# Patient Record
Sex: FEMALE | Race: WHITE | NOT HISPANIC OR LATINO
[De-identification: names, ages, dates, MRNs, and addresses within clinical notes are randomized per-mention and may not be internally consistent; named-entity substitution may affect disease eponyms.]

---

## 2017-01-31 ENCOUNTER — RECORDS - HEALTHEAST (OUTPATIENT)
Dept: ADMINISTRATIVE | Facility: OTHER | Age: 42
End: 2017-01-31

## 2017-03-02 ENCOUNTER — RECORDS - HEALTHEAST (OUTPATIENT)
Dept: ADMINISTRATIVE | Facility: OTHER | Age: 42
End: 2017-03-02

## 2017-05-02 ENCOUNTER — RECORDS - HEALTHEAST (OUTPATIENT)
Dept: ADMINISTRATIVE | Facility: OTHER | Age: 42
End: 2017-05-02

## 2017-05-03 ENCOUNTER — RECORDS - HEALTHEAST (OUTPATIENT)
Dept: ADMINISTRATIVE | Facility: OTHER | Age: 42
End: 2017-05-03

## 2017-05-30 ENCOUNTER — RECORDS - HEALTHEAST (OUTPATIENT)
Dept: ADMINISTRATIVE | Facility: OTHER | Age: 42
End: 2017-05-30

## 2017-08-24 ENCOUNTER — RECORDS - HEALTHEAST (OUTPATIENT)
Dept: ADMINISTRATIVE | Facility: OTHER | Age: 42
End: 2017-08-24

## 2017-10-03 ENCOUNTER — RECORDS - HEALTHEAST (OUTPATIENT)
Dept: ADMINISTRATIVE | Facility: OTHER | Age: 42
End: 2017-10-03

## 2017-10-11 ENCOUNTER — OFFICE VISIT - HEALTHEAST (OUTPATIENT)
Dept: FAMILY MEDICINE | Facility: CLINIC | Age: 42
End: 2017-10-11

## 2017-10-11 DIAGNOSIS — N81.6 RECTOCELE: ICD-10-CM

## 2017-10-11 DIAGNOSIS — R05.9 COUGH: ICD-10-CM

## 2017-10-11 DIAGNOSIS — F39 MOOD DISORDER (H): ICD-10-CM

## 2017-10-11 DIAGNOSIS — F41.9 ANXIETY: ICD-10-CM

## 2017-10-11 DIAGNOSIS — K64.9 HEMORRHOID: ICD-10-CM

## 2017-10-11 DIAGNOSIS — E66.9 OBESITY: ICD-10-CM

## 2017-10-11 RX ORDER — LORATADINE 10 MG/1
10 TABLET ORAL
Status: SHIPPED | COMMUNITY
Start: 2017-10-11

## 2017-10-18 ENCOUNTER — THERAPY VISIT (OUTPATIENT)
Dept: PHYSICAL THERAPY | Facility: CLINIC | Age: 42
End: 2017-10-18
Payer: COMMERCIAL

## 2017-10-18 DIAGNOSIS — M25.552 PAIN IN JOINT INVOLVING PELVIC REGION AND THIGH, LEFT: Primary | ICD-10-CM

## 2017-10-18 DIAGNOSIS — K56.41 OBSTRUCTIVE DEFECATION (H): ICD-10-CM

## 2017-10-18 PROCEDURE — 97530 THERAPEUTIC ACTIVITIES: CPT | Mod: GP | Performed by: PHYSICAL THERAPIST

## 2017-10-18 PROCEDURE — 97161 PT EVAL LOW COMPLEX 20 MIN: CPT | Mod: GP | Performed by: PHYSICAL THERAPIST

## 2017-10-18 PROCEDURE — 97110 THERAPEUTIC EXERCISES: CPT | Mod: GP | Performed by: PHYSICAL THERAPIST

## 2017-10-18 NOTE — LETTER
"[facility] PHYSICAL THERAPY  [address]  [facility] [address]  [phone]    2017    Re: Rica Szymanski   :   1975  MRN:  [id number]   REFERRING PHYSICIAN:   Jocelyne Hernández    [facility] PHYSICAL THERAPY    Date of Initial Evaluation:  10/18/2017  Visits:    1  Reason for Referral:     Pain in joint involving pelvic region and thigh, left  Obstructive defecation    EVALUATION SUMMARY    Subjective:  Patient is a 42 year old female presenting with rehab pelvic hpi. The history is provided by the patient. No  was used. Rica Szymanski is a 42 year old female with a pelvic dysfunction condition.  Condition occurred with:  Other reason (after hemorroidectomies).  Condition occurred: other.  This is a chronic condition  2017 she had pain due to a hemorroid. 3/2017 colonoscopy recommended. Incomplete due to vomiting.Had 2 hemorroid bandings 4 weeks apart with severe pain pressure \"traumatic\" \"felt like my rectum was prolapsing\". These Sx continued.  Referred to pfm specialist: defography showed rectum is falling and MD suggested this happened after the hemorroid bandings due to support being taken away, no rectocele. She strains 100% of the time with BM and experiences rectal spasms no incontinence dysparunea    with largest baby 7.5#. CSect x 2 with use of vacuum on 1st before the surgery   She has to triple void 2 x week and uses digital technique to remove all feces.    Patient reports pain:  Anal and vaginal.  Pain is described as sharp, aching and cramping and is intermittent and reported as 4/10.   Pain is the same all the time.  Exacerbated by: straining with BM. and relieved by nothing.  Since onset symptoms are unchanged.  General health as reported by patient is fair.  Pertinent medical history includes:  Migraines and overweight.  Medical allergies: none.  Other surgeries include:  " Heart surgery and other.  Current medications:  Pain medication.  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  Primary job tasks include:  Repetitive tasks and prolonged sitting. Barriers include:  None as reported by the patient.  Red flags:  None as reported by the patient.    Objective:  Standing Alignment:    Lumbar:  Sway back (she is fidgety in chair due to pain)  Gait:    Gait Type:  Normal     Flexibility/Screens:   Lower Extremity:  Decreased left lower extremity flexibility:Piriformis and Hamstrings  Decreased right lower extremity flexibility:  Piriformis and Hamstrings    Re: Rica Stephon   :   1975    Pelvic Dysfunction Evaluation:    Abdominal Wall:  Abdominal wall pelvic: TrA poor improved with manual and verbal cues.  Pelvic Clock Exam:  NA (no time)    Assessment/Plan:    Patient is a 42 year old female with pelvic complaints.    Patient has the following significant findings with corresponding treatment plan.                Diagnosis 1:  Obstructive defacation  Pain -  hot/cold therapy, manual therapy, self management, education, home program and biofeedback  Decreased ROM/flexibility - manual therapy, therapeutic exercise, therapeutic activity and home program  Decreased strength - therapeutic exercise, therapeutic activities and home program  Decreased function - therapeutic activities and home program    Therapy Evaluation Codes:   1) History comprised of:   Personal factors that impact the plan of care:      Past/current experiences.    Comorbidity factors that impact the plan of care are:      Heart problems, Migraines/headaches and Overweight.     Medications impacting care: Pain.  2) Examination of Body Systems comprised of:   Body structures and functions that impact the plan of care:      Pelvis.   Activity limitations that impact the plan of care are:      Sitting and defacation.  3) Clinical presentation characteristics  are:   Stable/Uncomplicated.  4) Decision-Making    Low complexity using standardized patient assessment instrument and/or measureable assessment of functional outcome.  Cumulative Therapy Evaluation is: Low complexity.    Previous and current functional limitations:  (See Goal Flow Sheet for this information)    Short term and Long term goals: (See Goal Flow Sheet for this information)   Communication ability:  Patient appears to be able to clearly communicate and understand verbal and written communication and follow directions correctly.  Treatment Explanation - The following has been discussed with the patient:   RX ordered/plan of care, Anticipated outcomes, Possible risks and side effects    This patient would benefit from PT intervention to resume normal activities.   Rehab potential is good.  Frequency:  1 X week, once daily  Duration:  for 8 weeks  Discharge Plan:  Achieve all LTG.  Independent in home treatment program.  Reach maximal therapeutic benefit.    Thank you for your referral.    INQUIRIES  Therapist: Adelia Talley   [place] [facility]  [address] [facility] [address]  [place] [address]  Phone: [phone]  Fax: [phone]

## 2017-10-20 PROBLEM — M25.552 PAIN IN JOINT INVOLVING PELVIC REGION AND THIGH, LEFT: Status: ACTIVE | Noted: 2017-10-20

## 2017-10-20 PROBLEM — K56.41 OBSTRUCTIVE DEFECATION (H): Status: ACTIVE | Noted: 2017-10-20

## 2017-10-20 NOTE — PROGRESS NOTES
"Subjective:    Patient is a 42 year old female presenting with rehab pelvic hpi. The history is provided by the patient. No  was used.   Rica Szymanski is a 42 year old female with a pelvic dysfunction condition.  Condition occurred with:  Other reason (after hemorroidectomies).  Condition occurred: other.  This is a chronic condition  2017 she had pain due to a hemorroid  3/2017 colonoscopy recommended. Incomplete due to vomiting.  Had 2 hemorroid bandings 4 weeks apart with severe pain pressure \"traumatic\"  \"felt like my rectum was prolapsing\"  These Sx continued   Referred to pfm specialist: defography showed rectum is falling and MD suggested this happened after the hemorroid bandings due to support being taken away, no rectocele    She strains 100% of the time with BM and experiences rectal spasms no incontinence dysparunea    with largest baby 7.5#  CSect x 2 with use of vacuum on 1st before the surgery   She has to triple void 2 x week and uses digital technique to remove all feces.    Patient reports pain:  Anal and vaginal.    Pain is described as sharp, aching and cramping and is intermittent and reported as 4/10.   Pain is the same all the time.  Exacerbated by: straining with BM. and relieved by nothing.  Since onset symptoms are unchanged.        General health as reported by patient is fair.  Pertinent medical history includes:  Migraines and overweight.  Medical allergies: none.  Other surgeries include:  Heart surgery and other.  Current medications:  Pain medication.  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  Primary job tasks include:  Repetitive tasks and prolonged sitting.    Barriers include:  None as reported by the patient.    Red flags:  None as reported by the patient.                        Objective:    Standing Alignment:        Lumbar:  Sway back (she is fidgety in chair due to pain)            Gait:    Gait Type:  " Normal         Flexibility/Screens:       Lower Extremity:  Decreased left lower extremity flexibility:Piriformis and Hamstrings    Decreased right lower extremity flexibility:  Piriformis and Hamstrings                                       Pelvic Dysfunction Evaluation:          Abdominal Wall:  Abdominal wall pelvic: TrA poor improved with manual and verbal cues.        Pelvic Clock Exam:  NA (no time)                                         General     ROS    Assessment/Plan:      Patient is a 42 year old female with pelvic complaints.    Patient has the following significant findings with corresponding treatment plan.                Diagnosis 1:  Obstructive defacation  Pain -  hot/cold therapy, manual therapy, self management, education, home program and biofeedback  Decreased ROM/flexibility - manual therapy, therapeutic exercise, therapeutic activity and home program  Decreased strength - therapeutic exercise, therapeutic activities and home program  Decreased function - therapeutic activities and home program    Therapy Evaluation Codes:   1) History comprised of:   Personal factors that impact the plan of care:      Past/current experiences.    Comorbidity factors that impact the plan of care are:      Heart problems, Migraines/headaches and Overweight.     Medications impacting care: Pain.  2) Examination of Body Systems comprised of:   Body structures and functions that impact the plan of care:      Pelvis.   Activity limitations that impact the plan of care are:      Sitting and defacation.  3) Clinical presentation characteristics are:   Stable/Uncomplicated.  4) Decision-Making    Low complexity using standardized patient assessment instrument and/or measureable assessment of functional outcome.  Cumulative Therapy Evaluation is: Low complexity.    Previous and current functional limitations:  (See Goal Flow Sheet for this information)    Short term and Long term goals: (See Goal Flow Sheet for this  information)     Communication ability:  Patient appears to be able to clearly communicate and understand verbal and written communication and follow directions correctly.  Treatment Explanation - The following has been discussed with the patient:   RX ordered/plan of care  Anticipated outcomes  Possible risks and side effects  This patient would benefit from PT intervention to resume normal activities.   Rehab potential is good.    Frequency:  1 X week, once daily  Duration:  for 8 weeks  Discharge Plan:  Achieve all LTG.  Independent in home treatment program.  Reach maximal therapeutic benefit.    Please refer to the daily flowsheet for treatment today, total treatment time and time spent performing 1:1 timed codes.

## 2017-10-25 ENCOUNTER — THERAPY VISIT (OUTPATIENT)
Dept: PHYSICAL THERAPY | Facility: CLINIC | Age: 42
End: 2017-10-25
Payer: COMMERCIAL

## 2017-10-25 DIAGNOSIS — K56.41 OBSTRUCTIVE DEFECATION (H): ICD-10-CM

## 2017-10-25 DIAGNOSIS — M25.552 PAIN IN JOINT INVOLVING PELVIC REGION AND THIGH, LEFT: ICD-10-CM

## 2017-10-25 PROCEDURE — 97110 THERAPEUTIC EXERCISES: CPT | Mod: GP | Performed by: PHYSICAL THERAPIST

## 2017-10-25 NOTE — PROGRESS NOTES
Subjective:    HPI                    Objective:    System    Physical Exam    General     ROS    Assessment/Plan:      DISCHARGE REPORT    Progress reporting period is from [id number] to [id number]       SUBJECTIVE  Subjective changes noted by patient:   50% improved with squatty potty and use of abdominals to deficate. she is very happy.    Current pain level is 1/10  .     Previous pain level was  4/10  .   Changes in function:  Yes (See Goal flowsheet attached for changes in current functional level)  Adverse reaction to treatment or activity: None    OBJECTIVE  Changes noted in objective findings:  Yes,   Objective: no internal exam needed TrA iso improved  3/5 strength  Squatty potty allows her to defacate without straining    ASSESSMENT/PLAN  Updated problem list and treatment plan: Diagnosis 1:  Obstructive defacation  Pain -  self management, education and home program  STG/LTGs have been met or progress has been made towards goals:  Yes (See Goal flow sheet completed today.)  Assessment of Progress: The patient has met all of their long term goals.  Self Management Plans:  Patient is independent in a home treatment program.  Patient is independent in self management of symptoms.    Rica continues to require the following intervention to meet STG and LTG's:  PT intervention is no longer required to meet STG/LTG.    Recommendations:  This patient is ready to be discharged from therapy and continue their home treatment program.    Please refer to the daily flowsheet for treatment today, total treatment time and time spent performing 1:1 timed codes.

## 2017-10-25 NOTE — LETTER
[facility] PHYSICAL THERAPY  [address]  [facility] [address]  [phone]    2017    Re: Rica Szymanski   :   1975  MRN:  [id number]   REFERRING PHYSICIAN:   Jocelyne Hernández    [facility] PHYSICAL THERAPY    Date of Initial Evaluation:  10/18/2017  Visits:   2  Reason for Referral:     Pain in joint involving pelvic region and thigh, left  Obstructive defecation    DISCHARGE REPORT:  Progress reporting period is from 10- to 10-     SUBJECTIVE  Subjective changes noted by patient:   50% improved with squatty potty and use of abdominals to deficate. she is very happy.    Current pain level is 1/10  .   Previous pain level was  4/10  .   Changes in function:  Yes (See Goal flowsheet attached for changes in current functional level).  Adverse reaction to treatment or activity: None    OBJECTIVE  Changes noted in objective findings:  Yes,   Objective: no internal exam needed TrA iso improved  3/5 strength  Squatty potty allows her to defacate without straining    ASSESSMENT/PLAN  Updated problem list and treatment plan: Diagnosis 1:  Obstructive defacation  Pain -  self management, education and home program  STG/LTGs have been met or progress has been made towards goals:  Yes (See Goal flow sheet completed today.)  Assessment of Progress: The patient has met all of their long term goals.  Self Management Plans:  Patient is independent in a home treatment program.  Patient is independent in self management of symptoms.  Rica continues to require the following intervention to meet STG and LTG's:  PT intervention is no longer required to meet STG/LTG.                        Re: Rica Szymanski   :   1975    Recommendations:  This patient is ready to be discharged from therapy and continue their home treatment program.      Thank you for your referral.    INQUIRIES      Therapist:   Sivan Stephen, PT,  cert MDT  [facility] [place] [facility]  [address] [facility] [address]  [place] [address]  Phone: [phone]  Fax: [phone]

## 2018-03-06 ENCOUNTER — COMMUNICATION - HEALTHEAST (OUTPATIENT)
Dept: ADMINISTRATIVE | Facility: CLINIC | Age: 43
End: 2018-03-06

## 2021-05-27 ENCOUNTER — RECORDS - HEALTHEAST (OUTPATIENT)
Dept: ADMINISTRATIVE | Facility: CLINIC | Age: 46
End: 2021-05-27

## 2021-06-02 ENCOUNTER — RECORDS - HEALTHEAST (OUTPATIENT)
Dept: ADMINISTRATIVE | Facility: CLINIC | Age: 46
End: 2021-06-02

## 2021-06-13 NOTE — PROGRESS NOTES
"ASSESSMENT/PLAN:  Rectocele, \"hypertonic pelvic floor muscle\"  Following with colorectal specialist  Has upcoming PT for pelvic floor muscle conditioning and biofeedback techniques    Hemorrhoid, external and internal  Had 2 sessions of internal hemorrhoid banding May 2, 2017, May 30, 2017  Following [facility] and colorectal specialist    GERD  Omeprazole 20 mg twice daily  Following [place] [facility]    Anxiety, Mood disorder  Multiple psychosocial stressors (change in job, health, family, recent deaths in family)  -     Ambulatory referral to Psychology    Cough  Suspect viral; however, if symptoms worsen over the next few days make take antibiotics  -     amoxicillin-clavulanate (AUGMENTIN) 875-125 mg per tablet; Take 1 tablet by mouth 2 (two) times a day for 10 days.    Obesity  Counseled lifestyle modifications  Motivational interviewing utilized  Has upcoming appointment with [employer] Coach later this week    SUBJECTIVE:    Rica Szymanski is a 42 y.o. female who comes in today     Earlier this the patient developed painful external hemorrhoids and worsening gastroesophageal reflux symptoms.  She was taking Zantac 300 milligrams at the time but still with significant symptoms.  She self referred to [place] [facility].  That consultation was available for review.  She then had a colonoscopy on March 20, 2017 which showed internal hemorrhoids however the complete exam was stopped because of respiratory compromise.  At the same time, she had an upper GI endoscopy which showed significant esophagitis.  She was put on omeprazole 20 mg twice daily.    As for her internal hemorrhoids, she was referred to another gastroenterologist for banding procedure.  She had 2 sessions of internal hemorrhoid banding on May 2, 2017 and May 30, 2017.  She immediately developed rectal fullness pain and discomfort following each of the session.  She was anticipated to have a third session but did not follow through " because of the significant symptoms postprocedure.  She then consulted with another gastroenterologist on 2017 for which she was referred to a colorectal specialist.  She had a nephrogram on October 3, 2017 which showed that she had hypertonic pelvic floor muscle.  She has been given a referral to physical therapy for biofeedback and pelvic floor muscle conditioning.  That appointment is in 2 months.    As result of the above symptoms, this has caused a significant amount of stress for her.  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  She has since found a new job will be starting with the [employer] system for which she is excited.  To add to the stress, she had to deaths in the family this year.  Her grandmother and uncle both  this year.  She is also dealing with the day-to-day stress of having children and juggling their schedules with her  and his schedule.    She expressed dissatisfaction with her weight.  She feels guilt.  She feels ashamed of herself and states that she needs to be [place] for her 2 daughters.  She has not followed through with any exercise regimen and admits that she has in discussion with regards to dietary intake.  She has been motivated however and has scheduled an appointment with [employer] coach this Thursday for which she is very excited to pursue.    Over the last few days she has been having nasal congestion, cough, chills, and not feeling well.  Denies fever, nausea, vomiting, diarrhea.  Denies sore throat.  Denies rash.  No sick contact.    Review of Systems (except those mentioned above)  Constitutional: Negative.   HENT: Negative.   Eyes: Negative.   Respiratory: Negative.   Cardiovascular: Negative.   Gastrointestinal: Negative.   Endocrine: Negative.   Genitourinary: Negative.   Musculoskeletal: Negative.   Skin: Negative.   Allergic/Immunologic: Negative.   Neurological: Negative.   Hematological: Negative.    Psychiatric/Behavioral: Negative.     Patient Active Problem List    Diagnosis Date Noted     Headache 2016     Pericarditis      Hay Fever      Vaccination Not Carried Out Due To Patient Refusal      Obesity      Plantar Fasciitis      No Known Allergies  Current Outpatient Prescriptions   Medication Sig Dispense Refill     CALCIUM CARBONATE/VITAMIN D3 (CALCIUM 600 + D,3, ORAL) 1 tablet daily.       cholecalciferol, vitamin D3, (VITAMIN D3) 1,000 unit capsule Take 1,000 Units by mouth daily. Take 2 capsule daily       ibuprofen (ADVIL,MOTRIN) 200 MG tablet 200 mg as needed.       loratadine (CLARITIN) 10 mg tablet Take 10 mg by mouth.       multivitamin with iron (ONE DAILY WITH IRON) Tab tablet 1 tablet daily.       omeprazole (PRILOSEC) 20 MG capsule 20 mg daily.       amoxicillin-clavulanate (AUGMENTIN) 875-125 mg per tablet Take 1 tablet by mouth 2 (two) times a day for 10 days. 20 tablet 0     aspirin 81 MG EC tablet 81 mg daily.       No current facility-administered medications for this visit.      No past medical history on file.  Past Surgical History:   Procedure Laterality Date     SC  DELIVERY ONLY      Description:  Section;  Recorded: 2010;  Comments: x2     SC INCIS HEART SAC WINDW FOR DRAIN      Description: Creation Of Pericardial Window;  Recorded: 2010;     Social History     Social History     Marital status:      Spouse name: N/A     Number of children: N/A     Years of education: N/A     Social History Main Topics     Smoking status: Former Smoker     Years: 5.00     Types: Cigarettes     Smokeless tobacco: Never Used     Alcohol use None     Drug use: None     Sexual activity: Not Asked     Other Topics Concern     None     Social History Narrative     Family History   Problem Relation Age of Onset     Colon cancer Paternal Grandmother      Age in 70's     Lung cancer Paternal Grandfather      Age in 60's         OBJECTIVE:    Vitals:    10/11/17  1448 10/11/17 1530   BP: (!) 122/92 122/78   Patient Site: Left Arm    Patient Position: Sitting    Cuff Size: Adult Large    Pulse: 80    Temp: 99.3  F (37.4  C)      There is no height or weight on file to calculate BMI.    Physical Exam:  Constitutional: Patient is oriented to person, place, and time. Patient appears well-developed and well-nourished. No distress.   Head: Normocephalic and atraumatic.   Right Ear: External ear normal.   Left Ear: External ear normal.   Nose: Nose normal.   Mouth/Throat: Oropharynx is clear and moist. No oropharyngeal exudate.   Eyes: Conjunctivae and EOM are normal. Pupils are equal, round, and reactive to light. Right eye exhibits no discharge. Left eye exhibits no discharge. No scleral icterus.   Neck: Neck supple. No JVD present. No tracheal deviation present. No thyromegaly present.   Lymphadenopathy:  Patient has no cervical adenopathy.   Cardiovascular: Normal rate, regular rhythm, normal heart sounds and intact distal pulses. No murmur heard.   Pulmonary/Chest: Effort normal and breath sounds normal. No stridor. No respiratory distress. Patient has no wheezes, no rales, exhibits no tenderness.   Abdominal: Soft. Bowel sounds are normal. Patient exhibits no distension and no mass. There is no tenderness. There is no rebound and no guarding.   Neurological: Patient is alert and oriented to person, place, and time. Patient has normal reflexes. No cranial nerve deficit. Coordination normal.   Skin: Skin is warm and dry. No rash noted. Patient is not diaphoretic. No erythema. No pallor.   The patient has good eye contact.  No psychomotor retardation or stereotypical behaviors.  Speech is regular rate, regular rhythm, adequate responses.  Mood is stable and affect is congruent mood.  No suicidal or homicidal intent.  No hallucination.      Results for orders placed or performed in visit on 05/05/16   Lipid Cascade   Result Value Ref Range    Cholesterol 202 (H) <=199 mg/dL     Triglycerides 138 <=149 mg/dL    HDL Cholesterol 41 (L) >=50 mg/dL    LDL Calculated 133 (H) <=129 mg/dL    Patient Fasting > 8hrs? Yes    Basic Metabolic Panel   Result Value Ref Range    Sodium 139 136 - 145 mmol/L    Potassium 4.8 3.5 - 5.0 mmol/L    Chloride 103 98 - 107 mmol/L    CO2 30 22 - 31 mmol/L    Anion Gap, Calculation 6 5 - 18 mmol/L    Glucose 110 70 - 125 mg/dL    Calcium 9.7 8.5 - 10.5 mg/dL    BUN 14 8 - 22 mg/dL    Creatinine 0.76 0.60 - 1.10 mg/dL    GFR MDRD Af Amer >60 >60 mL/min/1.73m2    GFR MDRD Non Af Amer >60 >60 mL/min/1.73m2   HM2(CBC w/o Differential)   Result Value Ref Range    WBC 7.4 4.0 - 11.0 thou/uL    RBC 4.52 3.80 - 5.40 mill/uL    Hemoglobin 13.8 12.0 - 16.0 g/dL    Hematocrit 41.1 35.0 - 47.0 %    MCV 91 80 - 100 fL    MCH 30.4 27.0 - 34.0 pg    MCHC 33.5 32.0 - 36.0 g/dL    RDW 12.2 11.0 - 14.5 %    Platelets 407 140 - 440 thou/uL    MPV 8.1 7.0 - 10.0 fL   Thyroid Stimulating Hormone (TSH)   Result Value Ref Range    TSH 1.69 0.30 - 5.00 uIU/mL